# Patient Record
(demographics unavailable — no encounter records)

---

## 2024-11-20 NOTE — HISTORY OF PRESENT ILLNESS
[de-identified] : 11/20/2024 : RAMA FULLER is a 14 year M here today in regard to right knee pain. He plays soccer and has experienced pain in the knee throughout this entire past season. He experienced similar pain last year. Its a bit swollen and tender right at a bony bump at the anterior inferior knee into upper shin. Hurt to run for a while but really hurts with contact in wrestling as of late.   Date of Injury/Onset: 2 months Pain:    At Rest: 0/10 With Activity: 7/10   Mechanism of injury: unsure how/why he is experiencing pain   Quality of symptoms: general dull, achy pain Improves with: ice and Motrin help a bit Worse with: any movement   Prior treatment/ Imaging: x-rays   Out of work: n/a School/Sport/Position/Occupation: soccer, currently out of wrestling Additional Information: None     Review of Systems: Constitutional: negative HEENT: negative CV: negative Pulm: negative GI: negative : negative Neuro: negative Skin: negative Endocrine: negative Heme: negative MSK: See HPI.

## 2024-11-20 NOTE — DISCUSSION/SUMMARY
[de-identified] : The patient and their family member(s) were advised of the diagnosis. The natural history of the pathology was explained in full to the patient and the family in layman's terms.  Here is the plan that we have set forth today. 1. 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  2. A home exercise protocol made up of stretching provided 3. Perform home stretching daily  4. If no improvement with home exercise program, we discussed trialing a formal physical therapy treatment plan.  5. Ok to participate in activities as tolerated. Discussed symptomatic activity modification. With activity: rest if pain is greater than a 3/10, limping occurs , or pain is lasting into the next day  6. Ice after activity. 15-20 mins at a time  7. CHOpat strap or compression brace for additional support was discussed today.  8. Follow up if not improving in the next 3 weeks. The patient and the family understands the plan of care as described above.  All questions have been answered. Thank you for allowing me to care for RAMA. Sincerely, Alva Crane, DO, FAAP, CAQ[facility]

## 2024-11-20 NOTE — DISCUSSION/SUMMARY
[de-identified] : The patient and their family member(s) were advised of the diagnosis. The natural history of the pathology was explained in full to the patient and the family in layman's terms.  Here is the plan that we have set forth today. 1. 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  2. A home exercise protocol made up of stretching provided 3. Perform home stretching daily  4. If no improvement with home exercise program, we discussed trialing a formal physical therapy treatment plan.  5. Ok to participate in activities as tolerated. Discussed symptomatic activity modification. With activity: rest if pain is greater than a 3/10, limping occurs , or pain is lasting into the next day  6. Ice after activity. 15-20 mins at a time  7. CHOpat strap or compression brace for additional support was discussed today.  8. Follow up if not improving in the next 3 weeks. The patient and the family understands the plan of care as described above.  All questions have been answered. Thank you for allowing me to care for RAMA. Sincerely, Alva Crane, DO, FAAP, CAQ[facility]

## 2024-11-20 NOTE — IMAGING
[de-identified] : Knee ROM                                      RIGHT EXTENSION: Neutral  FLEXION: 130   *mild end range guarding and tenderness at tibial tubercle.                          LEFT               EXTENSION: Neutral FLEXION: 130   Hamstring tightness   -20 deg lag bilat   Exam of the RIGHT  Knee: Ecchymosis: NONE Soft Tissues No redness, or localized warmth but small osseous bump with some surrounding soft tissue swelling at tibial tubercle. Effusion: NONE Tenderness: Tender to palpation at tib tubercle.  Special tests Jef's:NEGATIVE (NEG DEFAULT):60288 Pain with squatting/Duck walk ([place] Test):Not Examined Saira Test: Not Examined   Knee stability:  Varus: No Laxity Valgus: No Laxity Lachman and/or Anterior Drawer: Firm End Point         Posterior Drawer: NEGATIVE Posterolateral corner testing: Not Examined   Patella: Crepitus: none,  Inverted-J Sign: None noted Patellar apprehension: NEGATIVE Patellar grind: Negative   Patella: Mobility RIGHT QUADRANTS MEDIAL: 1-2 QUADRANTS LATERAL: 1-2  LEFT               QUADRANTS MEDIAL: 1-2  QUADRANTS LATERAL: 1-2   Strength: Atrophy: Quadriceps tone symmetric Patient able to perform a straight leg raise: Yes :No evidence of Extensor Lag Pain with resisted strength testing:with terminal knee flexion and end range resisted knee extension

## 2024-11-20 NOTE — HISTORY OF PRESENT ILLNESS
[de-identified] : 11/20/2024 : RAMA FULLER is a 14 year M here today in regard to right knee pain. He plays soccer and has experienced pain in the knee throughout this entire past season. He experienced similar pain last year. Its a bit swollen and tender right at a bony bump at the anterior inferior knee into upper shin. Hurt to run for a while but really hurts with contact in wrestling as of late.   Date of Injury/Onset: 2 months Pain:    At Rest: 0/10 With Activity: 7/10   Mechanism of injury: unsure how/why he is experiencing pain   Quality of symptoms: general dull, achy pain Improves with: ice and Motrin help a bit Worse with: any movement   Prior treatment/ Imaging: x-rays   Out of work: n/a School/Sport/Position/Occupation: soccer, currently out of wrestling Additional Information: None     Review of Systems: Constitutional: negative HEENT: negative CV: negative Pulm: negative GI: negative : negative Neuro: negative Skin: negative Endocrine: negative Heme: negative MSK: See HPI.

## 2024-11-20 NOTE — IMAGING
[de-identified] : Knee ROM                                      RIGHT EXTENSION: Neutral  FLEXION: 130   *mild end range guarding and tenderness at tibial tubercle.                          LEFT               EXTENSION: Neutral FLEXION: 130   Hamstring tightness   -20 deg lag bilat   Exam of the RIGHT  Knee: Ecchymosis: NONE Soft Tissues No redness, or localized warmth but small osseous bump with some surrounding soft tissue swelling at tibial tubercle. Effusion: NONE Tenderness: Tender to palpation at tib tubercle.  Special tests Jef's:NEGATIVE (NEG DEFAULT):32212 Pain with squatting/Duck walk ([place] Test):Not Examined Saira Test: Not Examined   Knee stability:  Varus: No Laxity Valgus: No Laxity Lachman and/or Anterior Drawer: Firm End Point         Posterior Drawer: NEGATIVE Posterolateral corner testing: Not Examined   Patella: Crepitus: none,  Inverted-J Sign: None noted Patellar apprehension: NEGATIVE Patellar grind: Negative   Patella: Mobility RIGHT QUADRANTS MEDIAL: 1-2 QUADRANTS LATERAL: 1-2  LEFT               QUADRANTS MEDIAL: 1-2  QUADRANTS LATERAL: 1-2   Strength: Atrophy: Quadriceps tone symmetric Patient able to perform a straight leg raise: Yes :No evidence of Extensor Lag Pain with resisted strength testing:with terminal knee flexion and end range resisted knee extension